# Patient Record
(demographics unavailable — no encounter records)

---

## 2024-10-24 NOTE — HISTORY OF PRESENT ILLNESS
[de-identified] : PT presents for second post-operative visit for Left total knee replacement with Altagracia robotic assistance.  . Surgical date is 09/16/2024. PT is 5-weeks status post. [de-identified] : 72-year-old female presenting with her daughter for second post-operative visit following left total knee replacement. She's now about five weeks out from surgery. She reports that she's still participating in outpatient physical therapy and does note ongoing functional improvement. The left knee has been getting less painful, and she rates the pain at this point 1 out of 10 in intensity. She does still exhibit some caution, particularly getting in and out of vehicles, but overall does feel that the left knee recovery is coming along. She complains chiefly today of worsening right knee pain that she reads at 7 out of 10 in intensity, which does require approximately 600 milligrams of ibuprofen per day. She has been ambulating without the use of an assistive device, but brings a rolling walker with her today.   Separately, she also complains of lateral shoulder pain and swelling, as well as difficulty raising her arm overhead.  [de-identified] : Gait: presents w/ rolling walker. Without the walker, she demonstrates a stable, non-antalgic gait pattern. Pt has difficulty relaxing both knees  Left knee:  - Focal soft tissue swelling: none - Ecchymosis: none - Erythema: none - Effusion: trace - Wounds: none - Alignment: normal - Tenderness: none - ROM: 2-135 - Collateral laxity: none - Cruciate laxity: none - Popliteal angle (degrees): 25 - Quad strength: 5/5  Right knee: - Focal soft tissue swelling: none - Ecchymosis: none - Erythema: none - Effusion: none - Wounds: none - Alignment: normal - Tenderness: medial and lateral joint line TTP - ROM: 0-135 - Collateral laxity: none - Cruciate laxity: none - Popliteal angle (degrees): 20 - Quad strength: 4+/5 - Patellar compression: pain and crepitus  Right shoulder: - She displays tenderness to palpation about the lateral acromion. She is unable to actively raise her shoulder above scapular height. Passively, able to elevate to approximately 130 degrees with pain and audible crepitus.  [de-identified] : 4 views of the left knee (weightbearing AP, weightbearing Ocasio, weightbearing lateral, and Sunrise) were interpreted by me and reviewed with the patient.  Location of imaging: [facility] Date of exam: 10/22/2024  Left knee --  - stable appearance of her left total knee replacement as compared to prior imaging without evidence of mechanical complication. Patellar height: normal Patellar tracking: central [de-identified] : 72-year-old female, five weeks status-post left total knee replacement with increasingly symptomatic right knee osteoarthritis and right shoulder rotator cuff tendinopathy and osteoarthritis. [de-identified] : - I provided a new referral to physical therapy with attention to the right knee and the right shoulder as well as the left total knee replacement. - I recommended that she use Tylenol and Celebrex as needed for all of her musculoskeletal complaints. She should continue with a home exercise program as well. - I administered a right knee cortisone injection today and we will apply for authorization for right knee hyaluronic acid injections to be administered whenever authorized. - We did review that she is a candidate to consider right total knee replacement as well whenever she feels ready.  - I provided her with a referral to the shoulder service to discuss additional options for her right cuff tear arthropathy

## 2024-10-24 NOTE — HISTORY OF PRESENT ILLNESS
[de-identified] : PT presents for second post-operative visit for Left total knee replacement with Altagracia robotic assistance.  . Surgical date is 09/16/2024. PT is 5-weeks status post. [de-identified] : 72-year-old female presenting with her daughter for second post-operative visit following left total knee replacement. She's now about five weeks out from surgery. She reports that she's still participating in outpatient physical therapy and does note ongoing functional improvement. The left knee has been getting less painful, and she rates the pain at this point 1 out of 10 in intensity. She does still exhibit some caution, particularly getting in and out of vehicles, but overall does feel that the left knee recovery is coming along. She complains chiefly today of worsening right knee pain that she reads at 7 out of 10 in intensity, which does require approximately 600 milligrams of ibuprofen per day. She has been ambulating without the use of an assistive device, but brings a rolling walker with her today.   Separately, she also complains of lateral shoulder pain and swelling, as well as difficulty raising her arm overhead.  [de-identified] : Gait: presents w/ rolling walker. Without the walker, she demonstrates a stable, non-antalgic gait pattern. Pt has difficulty relaxing both knees  Left knee:  - Focal soft tissue swelling: none - Ecchymosis: none - Erythema: none - Effusion: trace - Wounds: none - Alignment: normal - Tenderness: none - ROM: 2-135 - Collateral laxity: none - Cruciate laxity: none - Popliteal angle (degrees): 25 - Quad strength: 5/5  Right knee: - Focal soft tissue swelling: none - Ecchymosis: none - Erythema: none - Effusion: none - Wounds: none - Alignment: normal - Tenderness: medial and lateral joint line TTP - ROM: 0-135 - Collateral laxity: none - Cruciate laxity: none - Popliteal angle (degrees): 20 - Quad strength: 4+/5 - Patellar compression: pain and crepitus  Right shoulder: - She displays tenderness to palpation about the lateral acromion. She is unable to actively raise her shoulder above scapular height. Passively, able to elevate to approximately 130 degrees with pain and audible crepitus.  [de-identified] : 4 views of the left knee (weightbearing AP, weightbearing Ocasio, weightbearing lateral, and Sunrise) were interpreted by me and reviewed with the patient.  Location of imaging: [facility] Date of exam: 10/22/2024  Left knee --  - stable appearance of her left total knee replacement as compared to prior imaging without evidence of mechanical complication. Patellar height: normal Patellar tracking: central [de-identified] : 72-year-old female, five weeks status-post left total knee replacement with increasingly symptomatic right knee osteoarthritis and right shoulder rotator cuff tendinopathy and osteoarthritis. [de-identified] : - I provided a new referral to physical therapy with attention to the right knee and the right shoulder as well as the left total knee replacement. - I recommended that she use Tylenol and Celebrex as needed for all of her musculoskeletal complaints. She should continue with a home exercise program as well. - I administered a right knee cortisone injection today and we will apply for authorization for right knee hyaluronic acid injections to be administered whenever authorized. - We did review that she is a candidate to consider right total knee replacement as well whenever she feels ready.  - I provided her with a referral to the shoulder service to discuss additional options for her right cuff tear arthropathy

## 2024-10-24 NOTE — END OF VISIT
[FreeTextEntry3] :   Documented by Ridge Dash acting as a scribe for Dr. Jesse Grimaldo. 10/22/2024   All medical record entries made by the Ridge Dash (Scribe) were at my, Dr. Jesse Grimaldo, direction and personally dictated by me on 10/22/2024. I have reviewed the chart and agree that the record accurately reflects my personal performance of the history, physical exam, assessment and plan. I have also personally directed, reviewed, and agreed with the chart.

## 2024-10-24 NOTE — PROCEDURE
[de-identified] : Procedure: Knee joint injection Laterality: Right Indication: Osteoarthritis - discussed with patient Skin prep: alcohol and chlorhexidine Anesthesia: ethyl chloride spray Needle: 20-gauge Portal: inferolateral Aspiration: none Injectate: 2cc of 2% lidocaine, 2cc of 0.5% bupivacaine, and 1cc of 40mg/mL triamcinolone Dressing: Band-aid Complications: None

## 2024-10-24 NOTE — PROCEDURE
[de-identified] : Procedure: Knee joint injection Laterality: Right Indication: Osteoarthritis - discussed with patient Skin prep: alcohol and chlorhexidine Anesthesia: ethyl chloride spray Needle: 20-gauge Portal: inferolateral Aspiration: none Injectate: 2cc of 2% lidocaine, 2cc of 0.5% bupivacaine, and 1cc of 40mg/mL triamcinolone Dressing: Band-aid Complications: None

## 2024-11-13 NOTE — END OF VISIT
Diclofenac refill sent to pharmacy.    [FreeTextEntry3] :  Documented by Flaquita Alexander acting as a scribe for Dr. Todd Carson. 11/11/2024   All medical record entries made by the Scribe were at my, Dr. Todd Carson, direction and personally dictated by me on 11/11/2024. I have reviewed the chart and agree that the record accurately reflects my personal performance of the history, physical exam, assessment and plan. I have also personally directed, reviewed, and agreed with the chart.

## 2024-11-13 NOTE — HISTORY OF PRESENT ILLNESS
[de-identified] : 11/11/2024: 72-year-old female presenting for follow-up evaluation of R knee osteoarthritis, as well as L TKR. She was last seen here in office on October 22nd, and was administered a R knee cortisol injection. She reports that the injection has been continued to give her good relief at this time. She would like to hold off until early January to administer the R knee durolane injection. She would like to resume physical therapy.   07/16/2024: 72 year old female referred by Dr. Arce for surgical consultation for a right total knee arthroplasty. She has been having right knee pain for several years now and has had multiple courses of conservative management. She last had an HA injection in May, which she reports provided 4 days of relief. She last had a CSI in March, which also only provided a few days of relief. She has been participating in PT since May and notes mild improvement. She rates her pain at a 10/10. She localizes it to the lateral joint line and infrapatellar region, worse with walking. She reports a walking distance limitation of 1.5 block with the use of a cane. She has been using topical lidocaine patches and ibuprofen as needed for pain.   PMH of HTN and HLD.  Surgical history of bilateral total hip arthroplasties in 2012 and 2013 by Dr. Palacio and a gall bladder removal and colectomy. She is not clear on the reason for her right hip revision but based on imaging and recollection it appears to be a case of loosening of the acetabular component. She does not believe infection was involved.  Pt reports an allergy to Percocet (dizziness and disorientation)

## 2024-11-13 NOTE — PHYSICAL EXAM
[de-identified] : General appearance: well nourished and hydrated, pleasant, alert and oriented x 3, cooperative. HEENT: normocephalic, EOM intact, wearing mask, external auditory canal clear. Cardiovascular: no lower leg edema, no varicosities, dorsalis pedis pulses palpable and symmetric. Lymphatics: no palpable lymphadenopathy, no lymphedema. Neurologic: sensation is normal, no muscle weakness in upper or lower extremities, patella tendon reflexes present and symmetric. Dermatologic: skin moist, warm, no rash. Spine: cervical spine with normal lordosis and painless range of motion, thoracic spine with normal kyphosis and painless range of motion, lumbosacral spine with normal lordosis and painless range of motion.  No tenderness to palpation along midline spine and paraspinal musculature.  Sacroiliac joints nontender bilaterally. Negative SLR and crossed SLR tests bilaterally. Gait: Presents with a cane. Slow and cautious to stand and get started from a seated position. Demonstrates a cautious gait pattern without specific antalgia.    Left knee: - Focal soft tissue swelling: none - Ecchymosis: none - Erythema: none - Effusion: moderate, No palpable Baker's cysts - Wounds: none - Alignment: normal - Tenderness: Medial greater than lateral joint line tenderness to palpation. Pain and crepitus with patellar compression test - ROM:  - Collateral laxity: none - Cruciate laxity: none - Popliteal angle (degrees): 35 - Quad strength: 4+/5, approximately 15-20 degree extensor lag [de-identified] : Bilateral hip and left knee XRs  were interpreted by me and reviewed with the patient.   Location of imaging: [facility] Date of exam: 03/15/2024   Pelvic alignment: normal   Bilateral hips --  Stable appearance of bilateral total hip arthroplasties. All components well fixed and in reasonable alignment without evidence of mechanical complication.  Left knee -- Alignment: valgus Arthritis: tricompartmental osteoarthritis which is moderate in the tibiofemoral compartment and severe bone-on-bone in the patellofemoral compartment, Kellgren & Ty 3 Patellar height: normal Patellar tracking: slight lateral subluxation  ------------------------------------------------- We also reviewed a left knee noncontrast MRI taken at [facility] on 03/23/2024, which demonstrates: - Tricompartmental osteoarthritis with associated medial and lateral meniscal tears - An associated large effusion and synovitis.

## 2024-11-13 NOTE — DISCUSSION/SUMMARY
[de-identified] : 72-year-old female, now about two months S/P L TKR with R knee osteoarthritis.  - I provided her with a new physical therapy script. - We will follow up in January to administer R knee durolane injection, as well as follow up with the L TKR.  - We will have bilateral knee XRs at the next visit.

## 2024-11-13 NOTE — ADDENDUM
[FreeTextEntry1] :  I, Flaquita Alexander, acted as a scribe on behalf of Dr. Todd Carson. 11/11/2024

## 2024-11-13 NOTE — HISTORY OF PRESENT ILLNESS
[de-identified] : 11/11/2024: 72-year-old female presenting for follow-up evaluation of R knee osteoarthritis, as well as L TKR. She was last seen here in office on October 22nd, and was administered a R knee cortisol injection. She reports that the injection has been continued to give her good relief at this time. She would like to hold off until early January to administer the R knee durolane injection. She would like to resume physical therapy.   07/16/2024: 72 year old female referred by Dr. Arce for surgical consultation for a right total knee arthroplasty. She has been having right knee pain for several years now and has had multiple courses of conservative management. She last had an HA injection in May, which she reports provided 4 days of relief. She last had a CSI in March, which also only provided a few days of relief. She has been participating in PT since May and notes mild improvement. She rates her pain at a 10/10. She localizes it to the lateral joint line and infrapatellar region, worse with walking. She reports a walking distance limitation of 1.5 block with the use of a cane. She has been using topical lidocaine patches and ibuprofen as needed for pain.   PMH of HTN and HLD.  Surgical history of bilateral total hip arthroplasties in 2012 and 2013 by Dr. Palacio and a gall bladder removal and colectomy. She is not clear on the reason for her right hip revision but based on imaging and recollection it appears to be a case of loosening of the acetabular component. She does not believe infection was involved.  Pt reports an allergy to Percocet (dizziness and disorientation)

## 2024-11-13 NOTE — PHYSICAL EXAM
[de-identified] : General appearance: well nourished and hydrated, pleasant, alert and oriented x 3, cooperative. HEENT: normocephalic, EOM intact, wearing mask, external auditory canal clear. Cardiovascular: no lower leg edema, no varicosities, dorsalis pedis pulses palpable and symmetric. Lymphatics: no palpable lymphadenopathy, no lymphedema. Neurologic: sensation is normal, no muscle weakness in upper or lower extremities, patella tendon reflexes present and symmetric. Dermatologic: skin moist, warm, no rash. Spine: cervical spine with normal lordosis and painless range of motion, thoracic spine with normal kyphosis and painless range of motion, lumbosacral spine with normal lordosis and painless range of motion.  No tenderness to palpation along midline spine and paraspinal musculature.  Sacroiliac joints nontender bilaterally. Negative SLR and crossed SLR tests bilaterally. Gait: Presents with a cane. Slow and cautious to stand and get started from a seated position. Demonstrates a cautious gait pattern without specific antalgia.    Left knee: - Focal soft tissue swelling: none - Ecchymosis: none - Erythema: none - Effusion: moderate, No palpable Baker's cysts - Wounds: none - Alignment: normal - Tenderness: Medial greater than lateral joint line tenderness to palpation. Pain and crepitus with patellar compression test - ROM:  - Collateral laxity: none - Cruciate laxity: none - Popliteal angle (degrees): 35 - Quad strength: 4+/5, approximately 15-20 degree extensor lag [de-identified] : Bilateral hip and left knee XRs  were interpreted by me and reviewed with the patient.   Location of imaging: [facility] Date of exam: 03/15/2024   Pelvic alignment: normal   Bilateral hips --  Stable appearance of bilateral total hip arthroplasties. All components well fixed and in reasonable alignment without evidence of mechanical complication.  Left knee -- Alignment: valgus Arthritis: tricompartmental osteoarthritis which is moderate in the tibiofemoral compartment and severe bone-on-bone in the patellofemoral compartment, Kellgren & Ty 3 Patellar height: normal Patellar tracking: slight lateral subluxation  ------------------------------------------------- We also reviewed a left knee noncontrast MRI taken at [facility] on 03/23/2024, which demonstrates: - Tricompartmental osteoarthritis with associated medial and lateral meniscal tears - An associated large effusion and synovitis.

## 2024-11-13 NOTE — DISCUSSION/SUMMARY
[de-identified] : 72-year-old female, now about two months S/P L TKR with R knee osteoarthritis.  - I provided her with a new physical therapy script. - We will follow up in January to administer R knee durolane injection, as well as follow up with the L TKR.  - We will have bilateral knee XRs at the next visit.

## 2024-11-13 NOTE — END OF VISIT
[FreeTextEntry3] :  Documented by Flaquita Alexander acting as a scribe for Dr. Todd Carson. 11/11/2024   All medical record entries made by the Scribe were at my, Dr. Todd Carson, direction and personally dictated by me on 11/11/2024. I have reviewed the chart and agree that the record accurately reflects my personal performance of the history, physical exam, assessment and plan. I have also personally directed, reviewed, and agreed with the chart.

## 2024-11-13 NOTE — PHYSICAL EXAM
[de-identified] : General appearance: well nourished and hydrated, pleasant, alert and oriented x 3, cooperative. HEENT: normocephalic, EOM intact, wearing mask, external auditory canal clear. Cardiovascular: no lower leg edema, no varicosities, dorsalis pedis pulses palpable and symmetric. Lymphatics: no palpable lymphadenopathy, no lymphedema. Neurologic: sensation is normal, no muscle weakness in upper or lower extremities, patella tendon reflexes present and symmetric. Dermatologic: skin moist, warm, no rash. Spine: cervical spine with normal lordosis and painless range of motion, thoracic spine with normal kyphosis and painless range of motion, lumbosacral spine with normal lordosis and painless range of motion.  No tenderness to palpation along midline spine and paraspinal musculature.  Sacroiliac joints nontender bilaterally. Negative SLR and crossed SLR tests bilaterally. Gait: Presents with a cane. Slow and cautious to stand and get started from a seated position. Demonstrates a cautious gait pattern without specific antalgia.    Left knee: - Focal soft tissue swelling: none - Ecchymosis: none - Erythema: none - Effusion: moderate, No palpable Baker's cysts - Wounds: none - Alignment: normal - Tenderness: Medial greater than lateral joint line tenderness to palpation. Pain and crepitus with patellar compression test - ROM:  - Collateral laxity: none - Cruciate laxity: none - Popliteal angle (degrees): 35 - Quad strength: 4+/5, approximately 15-20 degree extensor lag [de-identified] : Bilateral hip and left knee XRs  were interpreted by me and reviewed with the patient.   Location of imaging: [facility] Date of exam: 03/15/2024   Pelvic alignment: normal   Bilateral hips --  Stable appearance of bilateral total hip arthroplasties. All components well fixed and in reasonable alignment without evidence of mechanical complication.  Left knee -- Alignment: valgus Arthritis: tricompartmental osteoarthritis which is moderate in the tibiofemoral compartment and severe bone-on-bone in the patellofemoral compartment, Kellgren & Ty 3 Patellar height: normal Patellar tracking: slight lateral subluxation  ------------------------------------------------- We also reviewed a left knee noncontrast MRI taken at [facility] on 03/23/2024, which demonstrates: - Tricompartmental osteoarthritis with associated medial and lateral meniscal tears - An associated large effusion and synovitis.  to be done in PACU

## 2024-11-13 NOTE — HISTORY OF PRESENT ILLNESS
[de-identified] : 11/11/2024: 72-year-old female presenting for follow-up evaluation of R knee osteoarthritis, as well as L TKR. She was last seen here in office on October 22nd, and was administered a R knee cortisol injection. She reports that the injection has been continued to give her good relief at this time. She would like to hold off until early January to administer the R knee durolane injection. She would like to resume physical therapy.   07/16/2024: 72 year old female referred by Dr. Arce for surgical consultation for a right total knee arthroplasty. She has been having right knee pain for several years now and has had multiple courses of conservative management. She last had an HA injection in May, which she reports provided 4 days of relief. She last had a CSI in March, which also only provided a few days of relief. She has been participating in PT since May and notes mild improvement. She rates her pain at a 10/10. She localizes it to the lateral joint line and infrapatellar region, worse with walking. She reports a walking distance limitation of 1.5 block with the use of a cane. She has been using topical lidocaine patches and ibuprofen as needed for pain.   PMH of HTN and HLD.  Surgical history of bilateral total hip arthroplasties in 2012 and 2013 by Dr. Palacio and a gall bladder removal and colectomy. She is not clear on the reason for her right hip revision but based on imaging and recollection it appears to be a case of loosening of the acetabular component. She does not believe infection was involved.  Pt reports an allergy to Percocet (dizziness and disorientation)

## 2024-11-13 NOTE — DISCUSSION/SUMMARY
[de-identified] : 72-year-old female, now about two months S/P L TKR with R knee osteoarthritis.  - I provided her with a new physical therapy script. - We will follow up in January to administer R knee durolane injection, as well as follow up with the L TKR.  - We will have bilateral knee XRs at the next visit.

## 2024-12-04 NOTE — DATA REVIEWED
[de-identified] : Audiology: Audiogram personally reviewed and interpreted and my findings are as follows (8/29/24): Right: SRT 40dB; %; Type A tymp; normal down sloping to mod-severe SNHL Left: SRT 55dB; WRS 72%; Tymp A tymp; moderate up to normal down to mod-severe SNHL [de-identified] : MRI IAC w contrast slides visually reviewed and personally interpreted as follows (date): no retrocochlear pathology, possible 2-3 millimeter T1 contrast enhancing lesion in the right cochlear seen on axial and coronal slices  MRI IAC w contrast radiology read: IMPRESSION: No evidence of vestibular schwannoma.  No evidence for acute territorial infarct, acute intracranial hemorrhage, or midline shift.

## 2024-12-04 NOTE — ASSESSMENT
[FreeTextEntry1] : L tinnitus - discussed the results of the MRI IAC w contrast which did not demonstrate retrocochlear pathology but a possible T1 contrast enhancing lesion in the right cochlea - amitriptyline 10mg QHS for tinnitus (6 months worth sent bc pt in [facility] for 6 months - f/u August 2025  Bilateral SNHL - asymmetric: R normal down to mod-severe SNHL; L moderate up to normal then down to mod-severe SNHL - MRI results as above - wearing hearing aids - f/u in 1 year with repeat audiogram around 08/2025  Cochlear schwannoma - possible R cochlear schwannoma on MRI IAC w contrast - repeat MRI IAC w contrast around 08/2025 - f/u in 1 year  Right Cerumen Impaction - Right cerumen impaction removed under microscopy with instrumentation  Post-Nasal Drip - Azelastine 1 spray to each nostril bid - discussed the risks of Azelastine spray which include but are not limited to: epistaxis, dry nose, pharyngitis, nasal irritation/pain, headache, sore throat, congestion, sinusitis, rhinorrhea - f/u in August 2025
[FreeTextEntry1] : L tinnitus - discussed the results of the MRI IAC w contrast which did not demonstrate retrocochlear pathology but a possible T1 contrast enhancing lesion in the right cochlea - amitriptyline 10mg QHS for tinnitus (6 months worth sent bc pt in [facility] for 6 months - f/u August 2025  Bilateral SNHL - asymmetric: R normal down to mod-severe SNHL; L moderate up to normal then down to mod-severe SNHL - MRI results as above - wearing hearing aids - f/u in 1 year with repeat audiogram around 08/2025  Cochlear schwannoma - possible R cochlear schwannoma on MRI IAC w contrast - repeat MRI IAC w contrast around 08/2025 - f/u in 1 year  Right Cerumen Impaction - Right cerumen impaction removed under microscopy with instrumentation  Post-Nasal Drip - Azelastine 1 spray to each nostril bid - discussed the risks of Azelastine spray which include but are not limited to: epistaxis, dry nose, pharyngitis, nasal irritation/pain, headache, sore throat, congestion, sinusitis, rhinorrhea - f/u in August 2025
Viral upper respiratory illness

## 2024-12-04 NOTE — HISTORY OF PRESENT ILLNESS
[de-identified] : Neurotology note 8/29/24: 72F who presents with left sided nonpulsatile tinnitus that started one month ago. She presents with her daughter. She was referred by a family member who works in the vascular department in [facility]. The patient states that she was taking a shower one day and the tinnitus started. No other inciting events. No otalgia, otorrhea, vertigo, acute hearing changes. Her daughter has noticed that she has needed to repeat herself when speaking with the patient over the past month. The patient does not have a hx of ear infections or ear surgery. The tinnitus bothers her at night when she is trying to sleep.  Telehealth 9/19/24: Discussed the results of the MRI IAC w contrast which did not demonstrate any retrocochlear pathology. The patient has not picked up her amitriptyline yet. Recently underwent knee surgery. I recommended that the patient obtain hearing aids as well. The patient is still suffering from left sided nonpulsatile tinnitus.  12/4/24: F/u for L sided tinnitus. Much improved after starting amitriptyline and also improved after wearing hearing aids. MRI IAC shows a R enhancing lesion in the cochlea which may be consistent with hx of labyrinthitis or cochlear schwannoma. She is going back to the [facility] for 6 months. The patient endorses chronic nasal congestion along with anterior/posterior chronic rhinorrhea.

## 2024-12-04 NOTE — DATA REVIEWED
[de-identified] : Audiology: Audiogram personally reviewed and interpreted and my findings are as follows (8/29/24): Right: SRT 40dB; %; Type A tymp; normal down sloping to mod-severe SNHL Left: SRT 55dB; WRS 72%; Tymp A tymp; moderate up to normal down to mod-severe SNHL [de-identified] : MRI IAC w contrast slides visually reviewed and personally interpreted as follows (date): no retrocochlear pathology, possible 2-3 millimeter T1 contrast enhancing lesion in the right cochlear seen on axial and coronal slices  MRI IAC w contrast radiology read: IMPRESSION: No evidence of vestibular schwannoma.  No evidence for acute territorial infarct, acute intracranial hemorrhage, or midline shift.

## 2024-12-04 NOTE — HISTORY OF PRESENT ILLNESS
[de-identified] : Neurotology note 8/29/24: 72F who presents with left sided nonpulsatile tinnitus that started one month ago. She presents with her daughter. She was referred by a family member who works in the vascular department in [facility]. The patient states that she was taking a shower one day and the tinnitus started. No other inciting events. No otalgia, otorrhea, vertigo, acute hearing changes. Her daughter has noticed that she has needed to repeat herself when speaking with the patient over the past month. The patient does not have a hx of ear infections or ear surgery. The tinnitus bothers her at night when she is trying to sleep.  Telehealth 9/19/24: Discussed the results of the MRI IAC w contrast which did not demonstrate any retrocochlear pathology. The patient has not picked up her amitriptyline yet. Recently underwent knee surgery. I recommended that the patient obtain hearing aids as well. The patient is still suffering from left sided nonpulsatile tinnitus.  12/4/24: F/u for L sided tinnitus. Much improved after starting amitriptyline and also improved after wearing hearing aids. MRI IAC shows a R enhancing lesion in the cochlea which may be consistent with hx of labyrinthitis or cochlear schwannoma. She is going back to the [facility] for 6 months. The patient endorses chronic nasal congestion along with anterior/posterior chronic rhinorrhea.

## 2024-12-04 NOTE — DISCUSSION/SUMMARY
[FreeTextEntry1] : Relayed to patient's daughter that I reviewed the MRI IAC w contrast with neuroradiology. There is a T1 enhancing lesion in the right cochlea that could be consistent with labyrinthitis versus cochlear schwannoma. I relayed this to the patient's daughter and mentioned that we will continue to monitor this finding with serial MRIs and that no further intervention is needed at this time. The patient's daughter demonstrated understanding.

## 2024-12-04 NOTE — PHYSICAL EXAM
[TextEntry] : General: AAO, no significant distress Psychiatric: affect pleasant and within normal limits Eyes: relatively symmetric, no obvious nystagmus Skin: no significant lesions on face Nose: no significant lesions; patent. Oral Cavity & Oropharynx: no significant deformity or lesions Neck/Lymphatics: no significant masses or abnormal cervical nodes palpated Respiratory: breathing comfortably; no significant distress Neurologic: cranial nerves II-XII grossly intact; EOMI Facial function: symmetric   Ear examination performed under binocular otologic microscope: Left Ear External: Pinna and periauricular area is normal. Canal: Ear canal skin is not inflamed or edematous. Tympanic Membrane: Intact and in good position.   Right Ear External: Pinna and periauricular area is normal. Canal: Ear canal skin is not inflamed or edematous. Right cerumen impaction cleaned under microscopy with instrumentation Tympanic Membrane: Intact and in good position.  Procedure: Right cerumen removal Pre-operative Diagnosis: Right cerumen impaction Post-operative Diagnosis: Right cerumen impaction Anesthesia: None Procedure Details: The patient was placed in the supine position. The right ear canal was determined to be impacted with cerumen. A curette and/or suction was used to remove the cerumen impaction under microscopy. Condition: Stable. Patient tolerated procedure well. Complications: None.  Nasal Endoscopy:   Pre-operative Diagnosis: post-nasal drip Post-operative Diagnosis: same Anesthesia: Topical or None Procedure: Bilateral nasal endoscopy Procedure Details:   The patient was placed in the seated position sitting straight up. The telescope was passed along the left nasal floor to the nasopharynx. It was then passed into the region of the middle meatus, middle turbinate, and the sphenoethmoid region. An identical procedure was performed on the right side.   Findings: Middle meatus: normal bilaterally Sphenoethmoidal recess: normal bilaterally Mucosa: normal bilaterally Nasal septum: normal  Discharge: none bilaterally Turbinates: bilateral inferior turbinate hypertrophy Adenoid: normal bilaterally Posterior choanae: normal bilaterally Eustachian tubes: normal bilaterally Mucous stranding: normal bilaterally Lesions: Not present   Comments: copious secretions anteirorly and posteriorly   Condition: Stable. Patient tolerated procedure well.

## 2025-01-08 NOTE — PROCEDURE
[de-identified] : Durolane injection - left / right / bilateral knee joint  LOT# [id number] EXP: 2027-04-30 MAN: Reinaldo NDC: [id number]  Procedure: Knee joint aspiration  Laterality: Right. Indication: diagnostic - discussed with patient Skin prep: alcohol and chlorhexidine Anesthesia: ethyl chloride spray Needle: 18-gauge Portal: superolateral Aspiration: 50 cc's of normal appearing synovial fluid. Injectate:  Durolane  Dressing: Band-aid Complications: None

## 2025-01-08 NOTE — HISTORY OF PRESENT ILLNESS
[de-identified] : 01/06/25: 73-year-old female presenting now about 3.5 months status post left total knee arthroplasty and also following up for right knee osteoarthritis. I last seen her in office on October 22nd and we administered a right knee cortisone injection which she states gave her good relief up until a few weeks ago. She's been  experiencing primarily pain swelling of the right knee and has been ambulating with the use of a cane; because of it, she is taking at this time Celebrex medication. Overall the left knee has been doing well.  11/11/2024: 72-year-old female presenting for follow-up evaluation of R knee osteoarthritis, as well as L TKR. She was last seen here in office on October 22nd, and was administered a R knee cortisol injection. She reports that the injection has been continued to give her good relief at this time. She would like to hold off until early January to administer the R knee durolane injection. She would like to resume physical therapy.  07/16/2024: 72 year old female referred by Dr. Arce for surgical consultation for a right total knee arthroplasty. She has been having right knee pain for several years now and has had multiple courses of conservative management. She last had an HA injection in May, which she reports provided 4 days of relief. She last had a CSI in March, which also only provided a few days of relief. She has been participating in PT since May and notes mild improvement. She rates her pain at a 10/10. She localizes it to the lateral joint line and infrapatellar region, worse with walking. She reports a walking distance limitation of 1.5 block with the use of a cane. She has been using topical lidocaine patches and ibuprofen as needed for pain.  PMH of HTN and HLD. Surgical history of bilateral total hip arthroplasties in 2012 and 2013 by Dr. Palacio and a gall bladder removal and colectomy. She is not clear on the reason for her right hip revision but based on imaging and recollection it appears to be a case of loosening of the acetabular component. She does not believe infection was involved. Pt reports an allergy to Percocet (dizziness and disorientation)

## 2025-01-08 NOTE — HISTORY OF PRESENT ILLNESS
[de-identified] : 01/06/25: 73-year-old female presenting now about 3.5 months status post left total knee arthroplasty and also following up for right knee osteoarthritis. I last seen her in office on October 22nd and we administered a right knee cortisone injection which she states gave her good relief up until a few weeks ago. She's been  experiencing primarily pain swelling of the right knee and has been ambulating with the use of a cane; because of it, she is taking at this time Celebrex medication. Overall the left knee has been doing well.  11/11/2024: 72-year-old female presenting for follow-up evaluation of R knee osteoarthritis, as well as L TKR. She was last seen here in office on October 22nd, and was administered a R knee cortisol injection. She reports that the injection has been continued to give her good relief at this time. She would like to hold off until early January to administer the R knee durolane injection. She would like to resume physical therapy.  07/16/2024: 72 year old female referred by Dr. Arce for surgical consultation for a right total knee arthroplasty. She has been having right knee pain for several years now and has had multiple courses of conservative management. She last had an HA injection in May, which she reports provided 4 days of relief. She last had a CSI in March, which also only provided a few days of relief. She has been participating in PT since May and notes mild improvement. She rates her pain at a 10/10. She localizes it to the lateral joint line and infrapatellar region, worse with walking. She reports a walking distance limitation of 1.5 block with the use of a cane. She has been using topical lidocaine patches and ibuprofen as needed for pain.  PMH of HTN and HLD. Surgical history of bilateral total hip arthroplasties in 2012 and 2013 by Dr. Palacio and a gall bladder removal and colectomy. She is not clear on the reason for her right hip revision but based on imaging and recollection it appears to be a case of loosening of the acetabular component. She does not believe infection was involved. Pt reports an allergy to Percocet (dizziness and disorientation)

## 2025-01-08 NOTE — DISCUSSION/SUMMARY
[de-identified] : IMP:73 year old female, now 3.5 months status post left total knee arthroplasty, and also with right knee patella femoral osteoarthritis.  - Today I administered a right knee aspiration and Durolane injection.  - I advised her to follow up and to contact the office in five months to reorder the injections.  - Patients eligible to repeat right knee cortisone injections as early as January 22nd, I advise her to contact the office as needed.  - In regards to the left knee, we will continue to do annual follow-ups of the left knee.  - Next time will be in September with left knee x-rays.

## 2025-01-08 NOTE — HISTORY OF PRESENT ILLNESS
[de-identified] : 01/06/25: 73-year-old female presenting now about 3.5 months status post left total knee arthroplasty and also following up for right knee osteoarthritis. I last seen her in office on October 22nd and we administered a right knee cortisone injection which she states gave her good relief up until a few weeks ago. She's been  experiencing primarily pain swelling of the right knee and has been ambulating with the use of a cane; because of it, she is taking at this time Celebrex medication. Overall the left knee has been doing well.  11/11/2024: 72-year-old female presenting for follow-up evaluation of R knee osteoarthritis, as well as L TKR. She was last seen here in office on October 22nd, and was administered a R knee cortisol injection. She reports that the injection has been continued to give her good relief at this time. She would like to hold off until early January to administer the R knee durolane injection. She would like to resume physical therapy.  07/16/2024: 72 year old female referred by Dr. Arce for surgical consultation for a right total knee arthroplasty. She has been having right knee pain for several years now and has had multiple courses of conservative management. She last had an HA injection in May, which she reports provided 4 days of relief. She last had a CSI in March, which also only provided a few days of relief. She has been participating in PT since May and notes mild improvement. She rates her pain at a 10/10. She localizes it to the lateral joint line and infrapatellar region, worse with walking. She reports a walking distance limitation of 1.5 block with the use of a cane. She has been using topical lidocaine patches and ibuprofen as needed for pain.  PMH of HTN and HLD. Surgical history of bilateral total hip arthroplasties in 2012 and 2013 by Dr. Palacio and a gall bladder removal and colectomy. She is not clear on the reason for her right hip revision but based on imaging and recollection it appears to be a case of loosening of the acetabular component. She does not believe infection was involved. Pt reports an allergy to Percocet (dizziness and disorientation)

## 2025-01-08 NOTE — DISCUSSION/SUMMARY
[de-identified] : IMP:73 year old female, now 3.5 months status post left total knee arthroplasty, and also with right knee patella femoral osteoarthritis.  - Today I administered a right knee aspiration and Durolane injection.  - I advised her to follow up and to contact the office in five months to reorder the injections.  - Patients eligible to repeat right knee cortisone injections as early as January 22nd, I advise her to contact the office as needed.  - In regards to the left knee, we will continue to do annual follow-ups of the left knee.  - Next time will be in September with left knee x-rays.

## 2025-01-08 NOTE — END OF VISIT
[FreeTextEntry3] : Documented by Kacy Diamond acting as a scribe for Dr. Jesse Grimaldo. 01/06/2025  All medical record entries made by the Scribe were at my, Dr. Jesse Grimaldo, direction and personally dictated by me on 01/06/2025. I have reviewed the chart and agree that the record accurately reflects my personal performance of the history, physical exam, assessment and plan. I have also personally directed, reviewed, and agreed with the chart.

## 2025-01-08 NOTE — PHYSICAL EXAM
[de-identified] : General appearance: well nourished and hydrated, pleasant, alert and oriented x 3, cooperative.   HEENT: normocephalic, EOM intact, wearing mask, external auditory canal clear.   Cardiovascular: no lower leg edema, no varicosities, dorsalis pedis pulses palpable and symmetric.   Lymphatics: no palpable lymphadenopathy, no lymphedema.   Neurologic: sensation is normal, no muscle weakness in upper or lower extremities, patella tendon reflexes present and symmetric.   Dermatologic: skin moist, warm, no rash.   Spine: cervical spine with normal lordosis and painless range of motion, thoracic spine with normal kyphosis and painless range of motion, lumbosacral spine with normal lordosis and painless range of motion.  No tenderness to palpation along midline spine and paraspinal musculature.  Sacroiliac joints nontender bilaterally. Negative SLR and crossed SLR tests bilaterally. Gait: Patient is using an antalgic right-sided gait with the use of a cane today.   Left knee:  - Focal soft tissue swelling: none - Baker cyst: none - Ecchymosis: none - Erythema: none - Effusion: none - Wounds: Well-healed anterior midline surgical incision, benign appearing. - Alignment: normal - Tenderness: no tenderness to the medial or lateral joint line. - ROM: 2-135 - Collateral laxity: stable - Cruciate laxity: stable - Popliteal angle (degrees):  - Quad strength: 5/5 - Extensor lag: none  Right knee: - Focal soft tissue swelling: none - Baker cyst: none - Ecchymosis: none - Erythema: none - Effusion: moderate-large - Wounds: none - Alignment: normal - Tenderness: tenderness to palpation of the medial and lateral joint line to palpation. - ROM: 2-125; with pain on terminal flexion on the anterior knee. - Collateral laxity: none - Cruciate laxity: none - Popliteal angle (degrees):  - Quad strength: 5/5 - Extensor lag: none   [de-identified] : Left knee has stable appearance of a left total knee replacement as compared to prior imaging without evidence of mechanical complication. Patella height is normal. Patella tract is central. Right knee, tricompartmental arthrofibrosis, most pronounced is the patella femoral compartment. K&L 3.

## 2025-01-08 NOTE — PROCEDURE
[de-identified] : Durolane injection - left / right / bilateral knee joint  LOT# [id number] EXP: 2027-04-30 MAN: Reinaldo NDC: [id number]  Procedure: Knee joint aspiration  Laterality: Right. Indication: diagnostic - discussed with patient Skin prep: alcohol and chlorhexidine Anesthesia: ethyl chloride spray Needle: 18-gauge Portal: superolateral Aspiration: 50 cc's of normal appearing synovial fluid. Injectate:  Durolane  Dressing: Band-aid Complications: None

## 2025-01-08 NOTE — PHYSICAL EXAM
[de-identified] : General appearance: well nourished and hydrated, pleasant, alert and oriented x 3, cooperative.   HEENT: normocephalic, EOM intact, wearing mask, external auditory canal clear.   Cardiovascular: no lower leg edema, no varicosities, dorsalis pedis pulses palpable and symmetric.   Lymphatics: no palpable lymphadenopathy, no lymphedema.   Neurologic: sensation is normal, no muscle weakness in upper or lower extremities, patella tendon reflexes present and symmetric.   Dermatologic: skin moist, warm, no rash.   Spine: cervical spine with normal lordosis and painless range of motion, thoracic spine with normal kyphosis and painless range of motion, lumbosacral spine with normal lordosis and painless range of motion.  No tenderness to palpation along midline spine and paraspinal musculature.  Sacroiliac joints nontender bilaterally. Negative SLR and crossed SLR tests bilaterally. Gait: Patient is using an antalgic right-sided gait with the use of a cane today.   Left knee:  - Focal soft tissue swelling: none - Baker cyst: none - Ecchymosis: none - Erythema: none - Effusion: none - Wounds: Well-healed anterior midline surgical incision, benign appearing. - Alignment: normal - Tenderness: no tenderness to the medial or lateral joint line. - ROM: 2-135 - Collateral laxity: stable - Cruciate laxity: stable - Popliteal angle (degrees):  - Quad strength: 5/5 - Extensor lag: none  Right knee: - Focal soft tissue swelling: none - Baker cyst: none - Ecchymosis: none - Erythema: none - Effusion: moderate-large - Wounds: none - Alignment: normal - Tenderness: tenderness to palpation of the medial and lateral joint line to palpation. - ROM: 2-125; with pain on terminal flexion on the anterior knee. - Collateral laxity: none - Cruciate laxity: none - Popliteal angle (degrees):  - Quad strength: 5/5 - Extensor lag: none   [de-identified] : Left knee has stable appearance of a left total knee replacement as compared to prior imaging without evidence of mechanical complication. Patella height is normal. Patella tract is central. Right knee, tricompartmental arthrofibrosis, most pronounced is the patella femoral compartment. K&L 3.

## 2025-01-08 NOTE — PHYSICAL EXAM
[de-identified] : General appearance: well nourished and hydrated, pleasant, alert and oriented x 3, cooperative.   HEENT: normocephalic, EOM intact, wearing mask, external auditory canal clear.   Cardiovascular: no lower leg edema, no varicosities, dorsalis pedis pulses palpable and symmetric.   Lymphatics: no palpable lymphadenopathy, no lymphedema.   Neurologic: sensation is normal, no muscle weakness in upper or lower extremities, patella tendon reflexes present and symmetric.   Dermatologic: skin moist, warm, no rash.   Spine: cervical spine with normal lordosis and painless range of motion, thoracic spine with normal kyphosis and painless range of motion, lumbosacral spine with normal lordosis and painless range of motion.  No tenderness to palpation along midline spine and paraspinal musculature.  Sacroiliac joints nontender bilaterally. Negative SLR and crossed SLR tests bilaterally. Gait: Patient is using an antalgic right-sided gait with the use of a cane today.   Left knee:  - Focal soft tissue swelling: none - Baker cyst: none - Ecchymosis: none - Erythema: none - Effusion: none - Wounds: Well-healed anterior midline surgical incision, benign appearing. - Alignment: normal - Tenderness: no tenderness to the medial or lateral joint line. - ROM: 2-135 - Collateral laxity: stable - Cruciate laxity: stable - Popliteal angle (degrees):  - Quad strength: 5/5 - Extensor lag: none  Right knee: - Focal soft tissue swelling: none - Baker cyst: none - Ecchymosis: none - Erythema: none - Effusion: moderate-large - Wounds: none - Alignment: normal - Tenderness: tenderness to palpation of the medial and lateral joint line to palpation. - ROM: 2-125; with pain on terminal flexion on the anterior knee. - Collateral laxity: none - Cruciate laxity: none - Popliteal angle (degrees):  - Quad strength: 5/5 - Extensor lag: none   [de-identified] : Left knee has stable appearance of a left total knee replacement as compared to prior imaging without evidence of mechanical complication. Patella height is normal. Patella tract is central. Right knee, tricompartmental arthrofibrosis, most pronounced is the patella femoral compartment. K&L 3.

## 2025-01-08 NOTE — DISCUSSION/SUMMARY
[de-identified] : IMP:73 year old female, now 3.5 months status post left total knee arthroplasty, and also with right knee patella femoral osteoarthritis.  - Today I administered a right knee aspiration and Durolane injection.  - I advised her to follow up and to contact the office in five months to reorder the injections.  - Patients eligible to repeat right knee cortisone injections as early as January 22nd, I advise her to contact the office as needed.  - In regards to the left knee, we will continue to do annual follow-ups of the left knee.  - Next time will be in September with left knee x-rays.

## 2025-01-08 NOTE — PROCEDURE
[de-identified] : Durolane injection - left / right / bilateral knee joint  LOT# [id number] EXP: 2027-04-30 MAN: Reinaldo NDC: [id number]  Procedure: Knee joint aspiration  Laterality: Right. Indication: diagnostic - discussed with patient Skin prep: alcohol and chlorhexidine Anesthesia: ethyl chloride spray Needle: 18-gauge Portal: superolateral Aspiration: 50 cc's of normal appearing synovial fluid. Injectate:  Durolane  Dressing: Band-aid Complications: None

## 2025-01-08 NOTE — PROCEDURE
[de-identified] : Durolane injection - left / right / bilateral knee joint  LOT# [id number] EXP: 2027-04-30 MAN: Reinaldo NDC: [id number]  Procedure: Knee joint aspiration  Laterality: Right. Indication: diagnostic - discussed with patient Skin prep: alcohol and chlorhexidine Anesthesia: ethyl chloride spray Needle: 18-gauge Portal: superolateral Aspiration: 50 cc's of normal appearing synovial fluid. Injectate:  Durolane  Dressing: Band-aid Complications: None

## 2025-01-08 NOTE — PHYSICAL EXAM
[de-identified] : General appearance: well nourished and hydrated, pleasant, alert and oriented x 3, cooperative.   HEENT: normocephalic, EOM intact, wearing mask, external auditory canal clear.   Cardiovascular: no lower leg edema, no varicosities, dorsalis pedis pulses palpable and symmetric.   Lymphatics: no palpable lymphadenopathy, no lymphedema.   Neurologic: sensation is normal, no muscle weakness in upper or lower extremities, patella tendon reflexes present and symmetric.   Dermatologic: skin moist, warm, no rash.   Spine: cervical spine with normal lordosis and painless range of motion, thoracic spine with normal kyphosis and painless range of motion, lumbosacral spine with normal lordosis and painless range of motion.  No tenderness to palpation along midline spine and paraspinal musculature.  Sacroiliac joints nontender bilaterally. Negative SLR and crossed SLR tests bilaterally. Gait: Patient is using an antalgic right-sided gait with the use of a cane today.   Left knee:  - Focal soft tissue swelling: none - Baker cyst: none - Ecchymosis: none - Erythema: none - Effusion: none - Wounds: Well-healed anterior midline surgical incision, benign appearing. - Alignment: normal - Tenderness: no tenderness to the medial or lateral joint line. - ROM: 2-135 - Collateral laxity: stable - Cruciate laxity: stable - Popliteal angle (degrees):  - Quad strength: 5/5 - Extensor lag: none  Right knee: - Focal soft tissue swelling: none - Baker cyst: none - Ecchymosis: none - Erythema: none - Effusion: moderate-large - Wounds: none - Alignment: normal - Tenderness: tenderness to palpation of the medial and lateral joint line to palpation. - ROM: 2-125; with pain on terminal flexion on the anterior knee. - Collateral laxity: none - Cruciate laxity: none - Popliteal angle (degrees):  - Quad strength: 5/5 - Extensor lag: none   [de-identified] : Left knee has stable appearance of a left total knee replacement as compared to prior imaging without evidence of mechanical complication. Patella height is normal. Patella tract is central. Right knee, tricompartmental arthrofibrosis, most pronounced is the patella femoral compartment. K&L 3.

## 2025-01-08 NOTE — ADDENDUM
[FreeTextEntry1] : Documented by Kacy Diamond acting as a scribe under Dr. Grimaldo. 01/06/2025
Name band;

## 2025-01-08 NOTE — DISCUSSION/SUMMARY
[de-identified] : IMP:73 year old female, now 3.5 months status post left total knee arthroplasty, and also with right knee patella femoral osteoarthritis.  - Today I administered a right knee aspiration and Durolane injection.  - I advised her to follow up and to contact the office in five months to reorder the injections.  - Patients eligible to repeat right knee cortisone injections as early as January 22nd, I advise her to contact the office as needed.  - In regards to the left knee, we will continue to do annual follow-ups of the left knee.  - Next time will be in September with left knee x-rays.

## 2025-01-08 NOTE — HISTORY OF PRESENT ILLNESS
[de-identified] : 01/06/25: 73-year-old female presenting now about 3.5 months status post left total knee arthroplasty and also following up for right knee osteoarthritis. I last seen her in office on October 22nd and we administered a right knee cortisone injection which she states gave her good relief up until a few weeks ago. She's been  experiencing primarily pain swelling of the right knee and has been ambulating with the use of a cane; because of it, she is taking at this time Celebrex medication. Overall the left knee has been doing well.  11/11/2024: 72-year-old female presenting for follow-up evaluation of R knee osteoarthritis, as well as L TKR. She was last seen here in office on October 22nd, and was administered a R knee cortisol injection. She reports that the injection has been continued to give her good relief at this time. She would like to hold off until early January to administer the R knee durolane injection. She would like to resume physical therapy.  07/16/2024: 72 year old female referred by Dr. Arce for surgical consultation for a right total knee arthroplasty. She has been having right knee pain for several years now and has had multiple courses of conservative management. She last had an HA injection in May, which she reports provided 4 days of relief. She last had a CSI in March, which also only provided a few days of relief. She has been participating in PT since May and notes mild improvement. She rates her pain at a 10/10. She localizes it to the lateral joint line and infrapatellar region, worse with walking. She reports a walking distance limitation of 1.5 block with the use of a cane. She has been using topical lidocaine patches and ibuprofen as needed for pain.  PMH of HTN and HLD. Surgical history of bilateral total hip arthroplasties in 2012 and 2013 by Dr. Palacio and a gall bladder removal and colectomy. She is not clear on the reason for her right hip revision but based on imaging and recollection it appears to be a case of loosening of the acetabular component. She does not believe infection was involved. Pt reports an allergy to Percocet (dizziness and disorientation)

## 2025-06-03 NOTE — END OF VISIT
[FreeTextEntry3] : Documented by Kacy Diamond acting as a scribe for Dr. Jesse Grimaldo. 06/03/2025  All medical record entries made by the Scribe were at my, Dr. Jesse Grimaldo, direction and personally dictated by me on 06/03/2025. I have reviewed the chart and agree that the record accurately reflects my personal performance of the history, physical exam, assessment and plan. I have also personally directed, reviewed, and agreed with the chart.

## 2025-06-03 NOTE — HISTORY OF PRESENT ILLNESS
[de-identified] : L TKA 9/16/24 06/03/2025: 73-year-old female accompanied by her daughter for follow-up evaluation of right knee osteoarthritis and left total knee replacement. Her left total knee replacement is now about nine months status post and is doing well. She does report occasional numbness on the lateral side but overall has good strength and overall function as well as minimal pain of the left knee. Her primary concern today is her right knee. We've last seen her in office on January administering right knee aspiration and Durolane injection. She recently followed up with a repeat right knee aspiration and Durolane injection back in April 11, back in the [facility], which she reports has not given her much relief this time around. The patient localizes her right knee pain at this point to both the anterior, medial, and posterior aspects of the right knee and describes particular difficulty rising from a seated position. She would like to plan for right total knee replacement at this time.   01/06/25: 73-year-old female presenting now about 3.5 months status post left total knee arthroplasty and also following up for right knee osteoarthritis. I last seen her in office on October 22nd and we administered a right knee cortisone injection which she states gave her good relief up until a few weeks ago. She's been experiencing primarily pain swelling of the right knee and has been ambulating with the use of a cane; because of it, she is taking at this time Celebrex medication. Overall the left knee has been doing well.  11/11/2024: 72-year-old female presenting for follow-up evaluation of R knee osteoarthritis, as well as L TKR. She was last seen here in office on October 22nd, and was administered a R knee cortisol injection. She reports that the injection has been continued to give her good relief at this time. She would like to hold off until early January to administer the R knee durolane injection. She would like to resume physical therapy.  07/16/2024: 72 year old female referred by Dr. Arce for surgical consultation for a right total knee arthroplasty. She has been having right knee pain for several years now and has had multiple courses of conservative management. She last had an HA injection in May, which she reports provided 4 days of relief. She last had a CSI in March, which also only provided a few days of relief. She has been participating in PT since May and notes mild improvement. She rates her pain at a 10/10. She localizes it to the lateral joint line and infrapatellar region, worse with walking. She reports a walking distance limitation of 1.5 block with the use of a cane. She has been using topical lidocaine patches and ibuprofen as needed for pain.  PMH of HTN and HLD. Surgical history of bilateral total hip arthroplasties in 2012 and 2013 by Dr. Palacio and a gall bladder removal and colectomy. She is not clear on the reason for her right hip revision but based on imaging and recollection it appears to be a case of loosening of the acetabular component. She does not believe infection was involved. Pt reports an allergy to Percocet (dizziness and disorientation)

## 2025-06-03 NOTE — DISCUSSION/SUMMARY
[de-identified] : IMP: 73-year-old female with stable, well-functioning left total knee replacement and progressively worsening right knee osteoarthritis. - She has indicated at this time for a right total knee replacement. -  We discussed the details of the procedure, the expected recovery period, and the expected outcome. We discussed the likelihood of satisfaction after complete recovery, and the potential causes of dissatisfaction. The importance of active patient participation in the rehabilitation protocol was emphasized, along with its influence on short and long-term outcomes. Specific risks of total knee replacement were discussed in detail. We discussed the risk of surgical site complications including but not limited to: surgical site infection, wound healing complications, bone fracture, tendon or ligament injury, neurovascular injury, hemorrhage, postoperative stiffness or instability, persistent pain and need for reoperation or manipulation under anesthesia. We discussed that it is typical to develop numbness of the knee lateral to the incision, which in most cases does improve over the course of the first postoperative year, but which can also be permanent. We discussed surgical blood loss and the possible need for blood transfusion. We discussed the risk of perioperative medical complications, including but not limited to catheter-associated urinary tract infection, venous thromboembolism and other cardiopulmonary complications. We discussed anesthetic options and the risk of anesthesia-related complications. We discussed implant fixation methods; my plan would be to use fully cemented fixation in this case. We discussed the variable need to resurface the patella; my plan would be to leave the patella unresurfaced in this case. We discussed the durability of prosthetic knees and limitations related to wear, osteolysis and loosening.  All questions were answered the patient's satisfaction. The patient was given a copy of my preoperative packet with additional information about the procedure. I asked the patient to either call back or schedule a followup appointment for any additional questions or concerns regarding the procedure. - She should be booked for surgery at a convenient time on or after July 11th of this year given her most recent injection on April 11th with routine medical clearance.  - Surgical notes. Contralateral implants were C-tibia plus stubby, 7 narrow PS femur, 11 PS poly, unresurfaced patella.

## 2025-06-03 NOTE — PHYSICAL EXAM
[de-identified] : General appearance: well nourished and hydrated, pleasant, alert and oriented x 3, cooperative.   HEENT: normocephalic, EOM intact, wearing mask, external auditory canal clear.   Cardiovascular: no lower leg edema, no varicosities, dorsalis pedis pulses palpable and symmetric.   Lymphatics: no palpable lymphadenopathy, no lymphedema.   Neurologic: sensation is normal, no muscle weakness in upper or lower extremities, patella tendon reflexes present and symmetric.   Dermatologic: skin moist, warm, no rash.   Spine: cervical spine with normal lordosis and painless range of motion, thoracic spine with normal kyphosis and painless range of motion, lumbosacral spine with normal lordosis and painless range of motion.  No tenderness to palpation along midline spine and paraspinal musculature.  Sacroiliac joints nontender bilaterally. Negative SLR and crossed SLR tests bilaterally. Gait: She presents today with a cane. Without the cane she demonstrates a cautious gate pattern. She has no specific antalgia.   Left knee:  - Focal soft tissue swelling: none - Baker cyst: No palpable Baker's cysts - Ecchymosis: none - Erythema: none - Effusion: none - Wounds:  well healed midline incision, benign appearing. - Alignment: normal - Tenderness: none - ROM: 0-135 - Collateral laxity: stable - Cruciate laxity: stable - Popliteal angle (degrees): 25 - Quad strength: 5/5 - Extensor lag: none  Right knee: - Focal soft tissue swelling: none - Baker cyst: No palpable Baker's cysts - Ecchymosis: none - Erythema: none - Effusion: small - Wounds: none - Alignment: normal - Tenderness: She has relatively mild medial and lateral joint line tenderness to palpitation. She has pain and crepitus with patellar compression test. - ROM: 0-130 - Collateral laxity: stable - Cruciate laxity: stable - Popliteal angle (degrees): 20 - Quad strength: 5/5 - Extensor lag: none

## 2025-07-10 NOTE — HISTORY OF PRESENT ILLNESS
[FreeTextEntry1] : 72 YO F with (LV 3.7 cm, LVEF 55-60%)and concentric LVH by TTE last in 2017. PMedHx also significant for HTN, HLD, Prediabetes, OA and Osteoporosis . Presents today as a new patient.  Seen by Dr Hendricks last year.  She is planning a left TKR with Dr Jesse Grimaldo Sept 16th, 2024. Home BP readings are better than what is in the chart.  7/2/25 getting a R TKR with Dr Grimaldo on 7/21. L tkr went well. bothered most by knee pain. pt denies chest pain, palpitations, SOB, orthopnea. walks with assistance d/t knee pain. home bps range from SBP 120s-130s. Dr Gallegos -pcp -fax [phone]  EKG: NSR, normal axis and intervals, no ST-Tw abnormalities. 7/2/25

## 2025-07-10 NOTE — HISTORY OF PRESENT ILLNESS
[FreeTextEntry1] : 74 YO F with (LV 3.7 cm, LVEF 55-60%)and concentric LVH by TTE last in 2017. PMedHx also significant for HTN, HLD, Prediabetes, OA and Osteoporosis . Presents today as a new patient.  Seen by Dr Hendricks last year.  She is planning a left TKR with Dr Jesse Grimaldo Sept 16th, 2024. Home BP readings are better than what is in the chart.  7/2/25 getting a R TKR with Dr Grimaldo on 7/21. L tkr went well. bothered most by knee pain. pt denies chest pain, palpitations, SOB, orthopnea. walks with assistance d/t knee pain. home bps range from SBP 120s-130s. Dr Gallegos -pcp -fax [phone]  EKG: NSR, normal axis and intervals, no ST-Tw abnormalities. 7/2/25

## 2025-07-10 NOTE — PHYSICAL EXAM
[Well Developed] : well developed [Well Nourished] : well nourished [No Acute Distress] : no acute distress [Normal Conjunctiva] : normal conjunctiva [Normal Venous Pressure] : normal venous pressure [No Carotid Bruit] : no carotid bruit [Normal S1, S2] : normal S1, S2 [No Murmur] : no murmur [No Rub] : no rub [No Gallop] : no gallop [Clear Lung Fields] : clear lung fields [Good Air Entry] : good air entry [No Respiratory Distress] : no respiratory distress  [Soft] : abdomen soft [Non Tender] : non-tender [No Masses/organomegaly] : no masses/organomegaly [Normal Bowel Sounds] : normal bowel sounds [Normal Gait] : normal gait [No Edema] : no edema [No Cyanosis] : no cyanosis [No Clubbing] : no clubbing [No Varicosities] : no varicosities [Normal DP B/L] : normal DP B/L [No Rash] : no rash [No Skin Lesions] : no skin lesions [Moves all extremities] : moves all extremities [No Focal Deficits] : no focal deficits [Normal Speech] : normal speech [Alert and Oriented] : alert and oriented [Normal memory] : normal memory [de-identified] : Arcus, mild cataracts [de-identified] : Dentures

## 2025-07-10 NOTE — PHYSICAL EXAM
[Well Developed] : well developed [Well Nourished] : well nourished [No Acute Distress] : no acute distress [Normal Conjunctiva] : normal conjunctiva [Normal Venous Pressure] : normal venous pressure [No Carotid Bruit] : no carotid bruit [Normal S1, S2] : normal S1, S2 [No Murmur] : no murmur [No Rub] : no rub [No Gallop] : no gallop [Clear Lung Fields] : clear lung fields [Good Air Entry] : good air entry [No Respiratory Distress] : no respiratory distress  [Soft] : abdomen soft [Non Tender] : non-tender [No Masses/organomegaly] : no masses/organomegaly [Normal Bowel Sounds] : normal bowel sounds [Normal Gait] : normal gait [No Edema] : no edema [No Cyanosis] : no cyanosis [No Clubbing] : no clubbing [No Varicosities] : no varicosities [Normal DP B/L] : normal DP B/L [No Rash] : no rash [No Skin Lesions] : no skin lesions [Moves all extremities] : moves all extremities [No Focal Deficits] : no focal deficits [Normal Speech] : normal speech [Alert and Oriented] : alert and oriented [Normal memory] : normal memory [de-identified] : Arcus, mild cataracts [de-identified] : Dentures

## 2025-07-10 NOTE — REASON FOR VISIT
[FreeTextEntry1] : PCP Dr Rosy Tejeda- Dr Jesse Grimaldo  Kanika Mcnamara daughter works with Dr Cagle  (surgical coordinator),  [phone]

## 2025-07-10 NOTE — ASSESSMENT
[FreeTextEntry1] : PreOp- 7/2/25 Able to walk on level ground without CP.  EKG NL. will update echo prior to clearance.  Labs done w pcp.  ***addendum -pt optimized for surgery from cardiology standpoint. echo with good LV fx.****  HTN- BP labile and up from stress and NSAIDs.  Will add low dose Toprol.  7/2/25 bp labile -mildly above goal with recheck today. will cont home bps and try lowering salt intake   HLD-  Continue statin  Prediabetes- A1c 5.8 many yrs ago.  Labs were drawn today in Office.

## 2025-07-10 NOTE — REASON FOR VISIT
[FreeTextEntry1] : PCP Dr Rosy Tejeda- Dr Jesse Grimaldo  Kanika Mcnamara daughter works with Dr Cagle  (surgical coordinator),  [email or web address] [phone]